# Patient Record
Sex: FEMALE | Race: WHITE | HISPANIC OR LATINO | Employment: OTHER | URBAN - METROPOLITAN AREA
[De-identification: names, ages, dates, MRNs, and addresses within clinical notes are randomized per-mention and may not be internally consistent; named-entity substitution may affect disease eponyms.]

---

## 2020-05-20 ENCOUNTER — TELEMEDICINE (OUTPATIENT)
Dept: DERMATOLOGY | Facility: CLINIC | Age: 75
End: 2020-05-20
Payer: COMMERCIAL

## 2020-05-20 DIAGNOSIS — Z13.89 SCREENING FOR SKIN CONDITION: ICD-10-CM

## 2020-05-20 DIAGNOSIS — L40.9 PSORIASIS: Primary | ICD-10-CM

## 2020-05-20 PROCEDURE — 99203 OFFICE O/P NEW LOW 30 MIN: CPT | Performed by: DERMATOLOGY

## 2020-05-20 RX ORDER — BETAMETHASONE VALERATE 0.1 %
LOTION (ML) TOPICAL 2 TIMES DAILY
Qty: 60 ML | Refills: 2 | Status: SHIPPED | OUTPATIENT
Start: 2020-05-20

## 2020-05-20 RX ORDER — ATORVASTATIN CALCIUM 20 MG/1
TABLET, FILM COATED ORAL
COMMUNITY
Start: 2020-03-16

## 2020-05-20 RX ORDER — AMLODIPINE BESYLATE 10 MG/1
TABLET ORAL
COMMUNITY
Start: 2020-04-27

## 2020-08-11 ENCOUNTER — APPOINTMENT (OUTPATIENT)
Dept: LAB | Facility: CLINIC | Age: 75
End: 2020-08-11
Payer: COMMERCIAL

## 2020-08-11 DIAGNOSIS — L40.9 PSORIASIS: ICD-10-CM

## 2020-08-11 LAB
ALBUMIN SERPL BCP-MCNC: 3.4 G/DL (ref 3.5–5)
ALP SERPL-CCNC: 92 U/L (ref 46–116)
ALT SERPL W P-5'-P-CCNC: 23 U/L (ref 12–78)
ANION GAP SERPL CALCULATED.3IONS-SCNC: 5 MMOL/L (ref 4–13)
AST SERPL W P-5'-P-CCNC: 15 U/L (ref 5–45)
BASOPHILS # BLD AUTO: 0.05 THOUSANDS/ΜL (ref 0–0.1)
BASOPHILS NFR BLD AUTO: 1 % (ref 0–1)
BILIRUB SERPL-MCNC: 0.97 MG/DL (ref 0.2–1)
BUN SERPL-MCNC: 15 MG/DL (ref 5–25)
CALCIUM SERPL-MCNC: 9.2 MG/DL (ref 8.3–10.1)
CHLORIDE SERPL-SCNC: 107 MMOL/L (ref 100–108)
CO2 SERPL-SCNC: 27 MMOL/L (ref 21–32)
CREAT SERPL-MCNC: 0.84 MG/DL (ref 0.6–1.3)
EOSINOPHIL # BLD AUTO: 0.22 THOUSAND/ΜL (ref 0–0.61)
EOSINOPHIL NFR BLD AUTO: 5 % (ref 0–6)
ERYTHROCYTE [DISTWIDTH] IN BLOOD BY AUTOMATED COUNT: 13.2 % (ref 11.6–15.1)
GFR SERPL CREATININE-BSD FRML MDRD: 68 ML/MIN/1.73SQ M
GLUCOSE SERPL-MCNC: 87 MG/DL (ref 65–140)
HCT VFR BLD AUTO: 45.5 % (ref 34.8–46.1)
HGB BLD-MCNC: 14.6 G/DL (ref 11.5–15.4)
IMM GRANULOCYTES # BLD AUTO: 0.01 THOUSAND/UL (ref 0–0.2)
IMM GRANULOCYTES NFR BLD AUTO: 0 % (ref 0–2)
LYMPHOCYTES # BLD AUTO: 2.12 THOUSANDS/ΜL (ref 0.6–4.47)
LYMPHOCYTES NFR BLD AUTO: 47 % (ref 14–44)
MCH RBC QN AUTO: 31.1 PG (ref 26.8–34.3)
MCHC RBC AUTO-ENTMCNC: 32.1 G/DL (ref 31.4–37.4)
MCV RBC AUTO: 97 FL (ref 82–98)
MONOCYTES # BLD AUTO: 0.43 THOUSAND/ΜL (ref 0.17–1.22)
MONOCYTES NFR BLD AUTO: 10 % (ref 4–12)
NEUTROPHILS # BLD AUTO: 1.67 THOUSANDS/ΜL (ref 1.85–7.62)
NEUTS SEG NFR BLD AUTO: 37 % (ref 43–75)
NRBC BLD AUTO-RTO: 0 /100 WBCS
PLATELET # BLD AUTO: 265 THOUSANDS/UL (ref 149–390)
PMV BLD AUTO: 10.2 FL (ref 8.9–12.7)
POTASSIUM SERPL-SCNC: 3.7 MMOL/L (ref 3.5–5.3)
PROT SERPL-MCNC: 7.6 G/DL (ref 6.4–8.2)
RBC # BLD AUTO: 4.69 MILLION/UL (ref 3.81–5.12)
SODIUM SERPL-SCNC: 139 MMOL/L (ref 136–145)
WBC # BLD AUTO: 4.5 THOUSAND/UL (ref 4.31–10.16)

## 2020-08-11 PROCEDURE — 36415 COLL VENOUS BLD VENIPUNCTURE: CPT

## 2020-08-11 PROCEDURE — 85025 COMPLETE CBC W/AUTO DIFF WBC: CPT

## 2020-08-11 PROCEDURE — 80053 COMPREHEN METABOLIC PANEL: CPT

## 2020-08-11 PROCEDURE — 86480 TB TEST CELL IMMUN MEASURE: CPT

## 2020-08-12 ENCOUNTER — TELEPHONE (OUTPATIENT)
Dept: DERMATOLOGY | Facility: CLINIC | Age: 75
End: 2020-08-12

## 2020-08-13 DIAGNOSIS — L40.9 PSORIASIS: Primary | ICD-10-CM

## 2020-08-13 LAB
GAMMA INTERFERON BACKGROUND BLD IA-ACNC: 0.09 IU/ML
M TB IFN-G BLD-IMP: NEGATIVE
M TB IFN-G CD4+ BCKGRND COR BLD-ACNC: -0.01 IU/ML
M TB IFN-G CD4+ BCKGRND COR BLD-ACNC: 0 IU/ML
MITOGEN IGNF BCKGRD COR BLD-ACNC: >10 IU/ML

## 2020-08-13 RX ORDER — TILDRAKIZUMAB-ASMN 100 MG/ML
100 INJECTION, SOLUTION SUBCUTANEOUS
Qty: 1 SYRINGE | Refills: 1 | Status: SHIPPED | OUTPATIENT
Start: 2020-08-13 | End: 2020-11-30 | Stop reason: SDUPTHER

## 2020-08-14 ENCOUNTER — TELEPHONE (OUTPATIENT)
Dept: DERMATOLOGY | Facility: CLINIC | Age: 75
End: 2020-08-14

## 2020-08-20 ENCOUNTER — OFFICE VISIT (OUTPATIENT)
Dept: DERMATOLOGY | Facility: CLINIC | Age: 75
End: 2020-08-20
Payer: COMMERCIAL

## 2020-08-20 ENCOUNTER — TELEPHONE (OUTPATIENT)
Dept: DERMATOLOGY | Facility: CLINIC | Age: 75
End: 2020-08-20

## 2020-08-20 VITALS — TEMPERATURE: 97.7 F

## 2020-08-20 DIAGNOSIS — L40.9 PSORIASIS: Primary | ICD-10-CM

## 2020-08-20 DIAGNOSIS — Z13.89 SCREENING FOR SKIN CONDITION: ICD-10-CM

## 2020-08-20 PROCEDURE — 99213 OFFICE O/P EST LOW 20 MIN: CPT | Performed by: DERMATOLOGY

## 2020-08-20 NOTE — PATIENT INSTRUCTIONS
Psoriasis pretty active despite therapy will go ahead at this point and have patient go on Illumya injection today and in 3 months if no improvement noted 6 months we will discuss other options

## 2020-08-20 NOTE — PROGRESS NOTES
[address] [facility] DERMATOLOGY  [address] [place] [address] [place] [facility] [id number] [phone]     MRN: [id number] : 1945  Encounter: [id number]  Patient Information: Dashawn Newton  Chief complaint: Three-month follow-up for psoriasis    History of present illness:  26-year-old female who was previously under the care of Dr Reyes Canard for psoriasis  Patient has been under treatment with Pat Harps few months Humira 3-4 years Stelara 2 years and Cosentyx 1 year previously and noted improvement initially with all these except for Pat Harps and then flared back up  Patient has been on Trinity Iza for six-month also noted initial improvement however now seems to be increased activity  Past Medical History:   Diagnosis Date    Hyperlipidemia     Hypertension     Kidney stones      No past surgical history on file  Social History   Social History     Substance and Sexual Activity   Alcohol Use Yes    Frequency: 2-4 times a month    Drinks per session: 1 or 2    Binge frequency: Never     Social History     Substance and Sexual Activity   Drug Use Never     Social History     Tobacco Use   Smoking Status Former Smoker    Packs/day: 0 25    Years: 40 00    Pack years: 10 00    Types: Cigarettes    Last attempt to quit: 2000    Years since quittin 2   Smokeless Tobacco Never Used     Family History   Problem Relation Age of Onset    Diabetes Mother      Meds/Allergies   No Known Allergies    Meds:  Prior to Admission medications    Medication Sig Start Date End Date Taking?  Authorizing Provider   amLODIPine (NORVASC) 10 mg tablet TAKE 1 TABLET BY MOUTH EVERY DAY 20  Yes Historical Provider, MD   atorvastatin (LIPITOR) 20 mg tablet TAKE 1 TABLET BY MOUTH EVERY DAY EVERY NIGHT 3/16/20  Yes Historical Provider, MD   Tildrakizumab-asmn (Ilumya) 100 MG/ML SOSY Inject 100 mg under the skin every 3 (three) months 20  Yes Pearl Dailey MD   betamethasone valerate (VALISONE) 0 1 % lotion Apply topically 2 (two) times a day To scalp  Patient not taking: Reported on 8/20/2020 5/20/20   Gloria Moseley MD       Subjective:     Review of Systems:    General: negative for - chills, fatigue, fever,  weight gain or weight loss  Psychological: negative for - anxiety, behavioral disorder, concentration difficulties, decreased libido, depression, irritability, memory difficulties, mood swings, sleep disturbances or suicidal ideation  ENT: negative for - hearing difficulties , nasal congestion, nasal discharge, oral lesions, sinus pain, sneezing, sore throat  Allergy and Immunology: negative for - hives, insect bite sensitivity,  Hematological and Lymphatic: negative for - bleeding problems, blood clots,bruising, swollen lymph nodes  Endocrine: negative for - hair pattern changes, hot flashes, malaise/lethargy, mood swings, palpitations, polydipsia/polyuria, skin changes, temperature intolerance or unexpected weight change  Respiratory: negative for - cough, hemoptysis, orthopnea, shortness of breath, or wheezing  Cardiovascular: negative for - chest pain, dyspnea on exertion, edema,  Gastrointestinal: negative for - abdominal pain, nausea/vomiting  Genito-Urinary: negative for - dysuria, incontinence, irregular/heavy menses or urinary frequency/urgency  Musculoskeletal: negative for - gait disturbance, joint pain, joint stiffness, joint swelling, muscle pain, muscular weakness  Dermatological:  As in HPI  Neurological: negative for confusion, dizziness, headaches, impaired coordination/balance, memory loss, numbness/tingling, seizures, speech problems, tremors or weakness       Objective:   Temp 97 7 °F (36 5 °C)     Physical Exam:    General Appearance:    Alert, cooperative, no distress   Head:    Normocephalic, without obvious abnormality, atraumatic           Skin:   A full skin exam was performed including scalp, head scalp, eyes, ears, nose, lips, neck, chest, axilla, abdomen, back, buttocks, bilateral upper extremities, bilateral lower extremities, hands, feet, fingers, toes, fingernails, and toenails scaling erythematous well-demarcated plaques noted on the dorsum of the legs plantar surface of the feet dorsum of the arms back and buttocks     Assessment:     1  Psoriasis     2  Screening for skin condition           Plan:   Psoriasis pretty active despite therapy will go ahead at this point and have patient go on Illumya injection today and in 3 months if no improvement noted 6 months we will discuss other options    Gloria oMseley MD  8/20/2020,4:24 PM    Portions of the record may have been created with voice recognition software   Occasional wrong word or "sound a like" substitutions may have occurred due to the inherent limitations of voice recognition software   Read the chart carefully and recognize, using context, where substitutions have occurred

## 2020-11-02 ENCOUNTER — TELEPHONE (OUTPATIENT)
Dept: DERMATOLOGY | Facility: CLINIC | Age: 75
End: 2020-11-02

## 2020-11-18 ENCOUNTER — OFFICE VISIT (OUTPATIENT)
Dept: DERMATOLOGY | Facility: CLINIC | Age: 75
End: 2020-11-18
Payer: COMMERCIAL

## 2020-11-18 VITALS — TEMPERATURE: 97.3 F

## 2020-11-18 DIAGNOSIS — L40.9 PSORIASIS: Primary | ICD-10-CM

## 2020-11-18 PROCEDURE — 99213 OFFICE O/P EST LOW 20 MIN: CPT | Performed by: DERMATOLOGY

## 2020-11-30 DIAGNOSIS — L40.9 PSORIASIS: ICD-10-CM

## 2020-11-30 RX ORDER — TILDRAKIZUMAB-ASMN 100 MG/ML
100 INJECTION, SOLUTION SUBCUTANEOUS
Qty: 1 SYRINGE | Refills: 1 | Status: SHIPPED | OUTPATIENT
Start: 2020-11-30 | End: 2021-03-04 | Stop reason: SDUPTHER

## 2021-02-12 DIAGNOSIS — Z23 ENCOUNTER FOR IMMUNIZATION: ICD-10-CM

## 2021-02-16 ENCOUNTER — TELEPHONE (OUTPATIENT)
Dept: DERMATOLOGY | Facility: CLINIC | Age: 76
End: 2021-02-16

## 2021-02-16 NOTE — TELEPHONE ENCOUNTER
[employer] called  Said that paperwork for assistance and refills couldn't be done  Patient's insurance was terminated  Patient was called and she said she got new insurance  She was emailing it to me and calling [employer] to give them the new information      [phone] - Fax  [phone] - Phone

## 2021-03-01 ENCOUNTER — TELEPHONE (OUTPATIENT)
Dept: DERMATOLOGY | Facility: CLINIC | Age: 76
End: 2021-03-01

## 2021-03-01 NOTE — TELEPHONE ENCOUNTER
----- Message from [facility] sent at 3/1/2021 10:10 AM EST -----  PT CXL   3/2/21 APPT  HER INJECTION IS SUPPOSE TO BE DELIVERED THIS WEEK ??  CALL TO  [facility]   WHEN WE HAVE HER INJECTION  SHE IS SCHD  TO GET HER 1ST  COVID SHOT ON FRI 3/5/21  SHE WANTS TO KNOW IF THESE  INJECTIONS  SHOULD BE   TAKEN SO CLOSE TO EACH OTHER ?

## 2021-03-03 DIAGNOSIS — L40.9 PSORIASIS: ICD-10-CM

## 2021-03-04 ENCOUNTER — TELEPHONE (OUTPATIENT)
Dept: DERMATOLOGY | Facility: CLINIC | Age: 76
End: 2021-03-04

## 2021-03-04 DIAGNOSIS — L40.9 PSORIASIS: ICD-10-CM

## 2021-03-04 RX ORDER — TILDRAKIZUMAB-ASMN 100 MG/ML
100 INJECTION, SOLUTION SUBCUTANEOUS
Qty: 1 SYRINGE | Refills: 3 | Status: SHIPPED | OUTPATIENT
Start: 2021-03-04 | End: 2021-09-09 | Stop reason: ALTCHOICE

## 2021-03-04 RX ORDER — TILDRAKIZUMAB-ASMN 100 MG/ML
100 INJECTION, SOLUTION SUBCUTANEOUS
Qty: 1 SYRINGE | Refills: 3 | OUTPATIENT
Start: 2021-03-04

## 2021-03-16 ENCOUNTER — OFFICE VISIT (OUTPATIENT)
Dept: DERMATOLOGY | Facility: CLINIC | Age: 76
End: 2021-03-16
Payer: COMMERCIAL

## 2021-03-16 VITALS — TEMPERATURE: 98.3 F

## 2021-03-16 DIAGNOSIS — L40.9 PSORIASIS: Primary | ICD-10-CM

## 2021-03-16 DIAGNOSIS — Z13.89 SCREENING FOR SKIN CONDITION: ICD-10-CM

## 2021-03-16 PROCEDURE — 99213 OFFICE O/P EST LOW 20 MIN: CPT | Performed by: DERMATOLOGY

## 2021-03-16 NOTE — PROGRESS NOTES
[address]  [address] [facility] [address]  [phone]  [phone]     MRN: [id number] : 1945  Encounter: [id number]  Patient Information: Celeste Blum  Chief complaint:  Follow-up for psoriasis    History of present illness:  54-year-old female with history of psoriasis continues to have flare despite being on the [facility] for over a year we had previously discussed that patient has been on numerous other therapies including both Cosentyx as well as [place] and Stelara and Humira  Patient wishes to to consider other therapy at this time  Past Medical History:   Diagnosis Date    Hyperlipidemia     Hypertension     Kidney stones      History reviewed  No pertinent surgical history  Social History   Social History     Substance and Sexual Activity   Alcohol Use Yes    Frequency: 2-4 times a month    Drinks per session: 1 or 2    Binge frequency: Never     Social History     Substance and Sexual Activity   Drug Use Never     Social History     Tobacco Use   Smoking Status Former Smoker    Packs/day: 0 25    Years: 40 00    Pack years: 10 00    Types: Cigarettes    Quit date: 2000    Years since quittin 8   Smokeless Tobacco Never Used     Family History   Problem Relation Age of Onset    Diabetes Mother      Meds/Allergies   No Known Allergies    Meds:  Prior to Admission medications    Medication Sig Start Date End Date Taking?  Authorizing Provider   amLODIPine (NORVASC) 10 mg tablet TAKE 1 TABLET BY MOUTH EVERY DAY 20  Yes Historical Provider, MD   atorvastatin (LIPITOR) 20 mg tablet TAKE 1 TABLET BY MOUTH EVERY DAY EVERY NIGHT 3/16/20  Yes Historical Provider, MD   Tildrakizumab-asmn (Ilumya) 100 MG/ML SOSY Inject 100 mg under the skin every 3 (three) months 3/4/21  Yes Florence Bryant MD   betamethasone valerate (VALISONE) 0 1 % lotion Apply topically 2 (two) times a day To scalp  Patient not taking: Reported on 8/20/2020 5/20/20   Anders Carter MD       Subjective:     Review of Systems:    General: negative for - chills, fatigue, fever,  weight gain or weight loss  Psychological: negative for - anxiety, behavioral disorder, concentration difficulties, decreased libido, depression, irritability, memory difficulties, mood swings, sleep disturbances or suicidal ideation  ENT: negative for - hearing difficulties , nasal congestion, nasal discharge, oral lesions, sinus pain, sneezing, sore throat  Allergy and Immunology: negative for - hives, insect bite sensitivity,  Hematological and Lymphatic: negative for - bleeding problems, blood clots,bruising, swollen lymph nodes  Endocrine: negative for - hair pattern changes, hot flashes, malaise/lethargy, mood swings, palpitations, polydipsia/polyuria, skin changes, temperature intolerance or unexpected weight change  Respiratory: negative for - cough, hemoptysis, orthopnea, shortness of breath, or wheezing  Cardiovascular: negative for - chest pain, dyspnea on exertion, edema,  Gastrointestinal: negative for - abdominal pain, nausea/vomiting  Genito-Urinary: negative for - dysuria, incontinence, irregular/heavy menses or urinary frequency/urgency  Musculoskeletal: negative for - gait disturbance, joint pain, joint stiffness, joint swelling, muscle pain, muscular weakness  Dermatological:  As in HPI  Neurological: negative for confusion, dizziness, headaches, impaired coordination/balance, memory loss, numbness/tingling, seizures, speech problems, tremors or weakness       Objective:   Temp 98 3 °F (36 8 °C)     Physical Exam:    General Appearance:    Alert, cooperative, no distress   Head:    Normocephalic, without obvious abnormality, atraumatic           Skin:   A full skin exam was performed including scalp, head scalp, eyes, ears, nose, lips, neck, chest, axilla, abdomen, back, buttocks, bilateral upper extremities, bilateral lower extremities, hands, feet, fingers, toes, fingernails, and toenails scaling erythematous plaques involving 30% of body surface area may be slight improvement from previous on her lower legs     Assessment:     1  Psoriasis     2  Screening for skin condition           Plan:   Psoriasis at present will go ahead and see if we get patient [place] patient will receive her injection of Terrie Delvalle Res, MD  9/40/5955,9:54 PM    Portions of the record may have been created with voice recognition software   Occasional wrong word or "sound a like" substitutions may have occurred due to the inherent limitations of voice recognition software   Read the chart carefully and recognize, using context, where substitutions have occurred

## 2021-03-16 NOTE — PROGRESS NOTES
Patient received her Ilumya Injection 100MG/mL    NDC: 78963-899-59  LOT: [id number]  EXP: 02/2022  SITE:  Left Upper Arm

## 2021-03-16 NOTE — PATIENT INSTRUCTIONS
Psoriasis at present will go ahead and see if we get patient Baron Motta patient will receive her injection of Iluymya today

## 2021-03-17 ENCOUNTER — TELEPHONE (OUTPATIENT)
Dept: DERMATOLOGY | Facility: CLINIC | Age: 76
End: 2021-03-17

## 2021-03-22 ENCOUNTER — TELEPHONE (OUTPATIENT)
Dept: DERMATOLOGY | Facility: CLINIC | Age: 76
End: 2021-03-22

## 2021-03-22 NOTE — TELEPHONE ENCOUNTER
Spoke with [employer] and started a prior authorization for [employer]  Spoke with [facility]  Two prior authorizations were started:    [facility]-[id number] - Quantity Review  [facility]-[id number] - Prior Authorization for Medication    Will have the outcome on 3/25/2021 at 3:00 pm    Also, spoke with Iraj Kenyon at [address] to clarify the prescription for [employer]

## 2021-04-12 ENCOUNTER — TELEPHONE (OUTPATIENT)
Dept: DERMATOLOGY | Facility: CLINIC | Age: 76
End: 2021-04-12

## 2021-05-28 ENCOUNTER — TELEPHONE (OUTPATIENT)
Dept: OTHER | Facility: OTHER | Age: 76
End: 2021-05-28

## 2021-05-28 NOTE — TELEPHONE ENCOUNTER
Beth Samano is trying to set up delivery of Ilumya for the patient  Please call her to tell her when the patient's next appointment is, so that she can set up delivery

## 2021-08-16 ENCOUNTER — TELEPHONE (OUTPATIENT)
Dept: DERMATOLOGY | Facility: CLINIC | Age: 76
End: 2021-08-16

## 2021-09-08 ENCOUNTER — TELEPHONE (OUTPATIENT)
Dept: DERMATOLOGY | Facility: CLINIC | Age: 76
End: 2021-09-08

## 2021-09-09 ENCOUNTER — TELEMEDICINE (OUTPATIENT)
Dept: DERMATOLOGY | Facility: CLINIC | Age: 76
End: 2021-09-09
Payer: COMMERCIAL

## 2021-09-09 DIAGNOSIS — L40.9 PSORIASIS: Primary | ICD-10-CM

## 2021-09-09 DIAGNOSIS — Z13.89 SCREENING FOR SKIN CONDITION: ICD-10-CM

## 2021-09-09 PROCEDURE — 99213 OFFICE O/P EST LOW 20 MIN: CPT | Performed by: DERMATOLOGY

## 2021-09-09 RX ORDER — RISANKIZUMAB-RZAA 75 MG/0.83
KIT SUBCUTANEOUS
COMMUNITY
Start: 2021-03-19

## 2021-09-09 NOTE — PROGRESS NOTES
Virtual Regular Visit    Verification of patient location:    Patient is located in the following state in which I hold an active license [place]      Assessment/Plan:  1  Psoriasis     2  Screening for skin condition      psoriasis under better control   still active will go ahead and continue same therapy will need to repeat her blood work at her next visit  Will go ahead and continue same therapy and re-evaluate in 3 months    Problem List Items Addressed This Visit     None               Reason for visit is  Follow-up for psoriasis  Chief Complaint   Patient presents with    Follow-up    Virtual Regular Visit    Psoriasis    Virtual Regular Visit        Encounter provider [address]    Provider located at [address] [place] [address]      Recent Visits  Date Type Provider Dept   09/08/21 Telephone [facility] Dermatology   Showing recent visits within past 7 days and meeting all other requirements  Today's Visits  Date Type Provider Dept   09/09/21 Telemedicine [address] Dermatology   Showing today's visits and meeting all other requirements  Future Appointments  No visits were found meeting these conditions  Showing future appointments within next 150 days and meeting all other requirements       The patient was identified by name and date of birth  Gena De Paz was informed that this is a telemedicine visit and that the visit is being conducted through [address] [place] [address] Now and patient was informed that this is a secure, HIPAA-compliant platform  She agrees to proceed     My office door was closed  No one else was in the room  She acknowledged consent and understanding of privacy and security of the video platform  The patient has agreed to participate and understands they can discontinue the visit at any time      Patient is aware this is a billable service  Subjective  Connie Rasmussen is a 67 y o  female  With history of resistant psoriasis presents for follow-up patient was switched to [facility] and has received 3 injections so far patient notes some improvement but still has consistent disease patient has noticed some improvement but feels also the son is helping her at this time  Patient notably had a blood tests by Dr Prince Quiroga which revealed an VILLA 1:320 with nucleolar pattern  HPI     Past Medical History:   Diagnosis Date    Hyperlipidemia     Hypertension     Kidney stones        No past surgical history on file  Current Outpatient Medications   Medication Sig Dispense Refill    Risankizumab-rzaa,150 MG Dose, (Skyrizi, 150 MG Dose,) 75 MG/0 83ML PSKT       amLODIPine (NORVASC) 10 mg tablet TAKE 1 TABLET BY MOUTH EVERY DAY      atorvastatin (LIPITOR) 20 mg tablet TAKE 1 TABLET BY MOUTH EVERY DAY EVERY NIGHT      betamethasone valerate (VALISONE) 0 1 % lotion Apply topically 2 (two) times a day To scalp (Patient not taking: Reported on 8/20/2020) 60 mL 2     No current facility-administered medications for this visit  No Known Allergies    Review of Systems    Video Exam    There were no vitals filed for this visit  Physical Exam   Scaling erythematous patches noted on the arms and legs appear much thinner but present    I spent 15 minutes directly with the patient during this visit    VIRTUAL VISIT DISCLAIMER      Connie Rasmussen verbally agrees to participate in [employer]  Pt is aware that [employer] could be limited without vital signs or the ability to perform a full hands-on physical Shannan Marrufo understands she or the provider may request at any time to terminate the video visit and request the patient to seek care or treatment in person

## 2021-10-21 DIAGNOSIS — L40.9 PSORIASIS: Primary | ICD-10-CM

## 2021-10-21 RX ORDER — RISANKIZUMAB-RZAA 150 MG/ML
150 INJECTION SUBCUTANEOUS
Qty: 150 ML | Refills: 1 | Status: SHIPPED | OUTPATIENT
Start: 2021-10-21 | End: 2022-05-12 | Stop reason: SDUPTHER

## 2021-11-01 ENCOUNTER — TELEPHONE (OUTPATIENT)
Dept: DERMATOLOGY | Facility: CLINIC | Age: 76
End: 2021-11-01

## 2021-12-02 ENCOUNTER — TELEPHONE (OUTPATIENT)
Dept: DERMATOLOGY | Facility: CLINIC | Age: 76
End: 2021-12-02

## 2021-12-09 ENCOUNTER — OFFICE VISIT (OUTPATIENT)
Dept: DERMATOLOGY | Facility: CLINIC | Age: 76
End: 2021-12-09
Payer: COMMERCIAL

## 2021-12-09 VITALS — TEMPERATURE: 98.9 F | WEIGHT: 178.2 LBS

## 2021-12-09 DIAGNOSIS — Z13.89 SCREENING FOR SKIN CONDITION: ICD-10-CM

## 2021-12-09 DIAGNOSIS — L40.9 PSORIASIS: Primary | ICD-10-CM

## 2021-12-09 PROCEDURE — 99213 OFFICE O/P EST LOW 20 MIN: CPT | Performed by: DERMATOLOGY

## 2022-03-07 ENCOUNTER — TELEPHONE (OUTPATIENT)
Dept: DERMATOLOGY | Facility: CLINIC | Age: 77
End: 2022-03-07

## 2022-03-07 NOTE — TELEPHONE ENCOUNTER
Prior Authorization started on the phone with [employer] for [employer]  [id number]  26 hour turn around

## 2022-04-25 ENCOUNTER — OFFICE VISIT (OUTPATIENT)
Dept: DERMATOLOGY | Facility: CLINIC | Age: 77
End: 2022-04-25
Payer: COMMERCIAL

## 2022-04-25 VITALS — HEIGHT: 61 IN | BODY MASS INDEX: 31.15 KG/M2 | WEIGHT: 165 LBS

## 2022-04-25 DIAGNOSIS — L40.9 PSORIASIS: Primary | ICD-10-CM

## 2022-04-25 DIAGNOSIS — Z13.89 SCREENING FOR SKIN CONDITION: ICD-10-CM

## 2022-04-25 PROCEDURE — 99214 OFFICE O/P EST MOD 30 MIN: CPT | Performed by: DERMATOLOGY

## 2022-04-25 RX ORDER — BETAMETHASONE DIPROPIONATE 0.5 MG/G
CREAM TOPICAL 2 TIMES DAILY
Qty: 45 G | Refills: 3 | Status: SHIPPED | OUTPATIENT
Start: 2022-04-25

## 2022-04-25 NOTE — PROGRESS NOTES
[address] [facility] DERMATOLOGY  [address] [place] [address]  [phone]  [phone]     MRN: [id number] : 1945  Encounter: [id number]  Patient Information: Elvis Lindquist  Chief complaint:  Four-month Checkup    History of present illness:  76-year-old female with psoriasis on [place] notes a slight flare up on her legs about a month prior to her expected injection other that has been doing well  No other concerns noted  Past Medical History:   Diagnosis Date    Hyperlipidemia     Hypertension     Kidney stones      History reviewed  No pertinent surgical history  Social History   Social History     Substance and Sexual Activity   Alcohol Use Yes     Social History     Substance and Sexual Activity   Drug Use Never     Social History     Tobacco Use   Smoking Status Former Smoker    Packs/day: 0 25    Years: 40 00    Pack years: 10 00    Types: Cigarettes    Quit date: 2000    Years since quittin 9   Smokeless Tobacco Never Used     Family History   Problem Relation Age of Onset    Diabetes Mother      Meds/Allergies   No Known Allergies    Meds:  Prior to Admission medications    Medication Sig Start Date End Date Taking?  Authorizing Provider   amLODIPine (NORVASC) 10 mg tablet TAKE 1 TABLET BY MOUTH EVERY DAY 20  Yes Historical Provider, MD   atorvastatin (LIPITOR) 20 mg tablet TAKE 1 TABLET BY MOUTH EVERY DAY EVERY NIGHT 3/16/20  Yes Historical Provider, MD Liseth Hoffman Dex Share) 150 MG/ML SOSY Inject 150 mg under the skin every 3 (three) months 10/21/21  Yes Shyam Dillon MD   betamethasone valerate (VALISONE) 0 1 % lotion Apply topically 2 (two) times a day To scalp  Patient not taking: Reported on 2020   Shyam Dillon MD   Risankizumab-rzaa,150 MG Dose, (Skyrizi, 150 MG Dose,) 75 MG/0 83ML PSKT  3/19/21   Historical Provider, MD       Subjective:     Review of Systems:    General: negative for - chills, fatigue, fever,  weight gain or weight loss  Psychological: negative for - anxiety, behavioral disorder, concentration difficulties, decreased libido, depression, irritability, memory difficulties, mood swings, sleep disturbances or suicidal ideation  ENT: negative for - hearing difficulties , nasal congestion, nasal discharge, oral lesions, sinus pain, sneezing, sore throat  Allergy and Immunology: negative for - hives, insect bite sensitivity,  Hematological and Lymphatic: negative for - bleeding problems, blood clots,bruising, swollen lymph nodes  Endocrine: negative for - hair pattern changes, hot flashes, malaise/lethargy, mood swings, palpitations, polydipsia/polyuria, skin changes, temperature intolerance or unexpected weight change  Respiratory: negative for - cough, hemoptysis, orthopnea, shortness of breath, or wheezing  Cardiovascular: negative for - chest pain, dyspnea on exertion, edema,  Gastrointestinal: negative for - abdominal pain, nausea/vomiting  Genito-Urinary: negative for - dysuria, incontinence, irregular/heavy menses or urinary frequency/urgency  Musculoskeletal: negative for - gait disturbance, joint pain, joint stiffness, joint swelling, muscle pain, muscular weakness  Dermatological:  As in HPI  Neurological: negative for confusion, dizziness, headaches, impaired coordination/balance, memory loss, numbness/tingling, seizures, speech problems, tremors or weakness       Objective:   Ht 5' 1" (1 549 m)   Wt 74 8 kg (165 lb)   BMI 31 18 kg/m²     Physical Exam:    General Appearance:    Alert, cooperative, no distress   Head:    Normocephalic, without obvious abnormality, atraumatic           Skin:   A full skin exam was performed including scalp, head scalp, eyes, ears, nose, lips, neck, chest, axilla, abdomen, back, buttocks, bilateral upper extremities, bilateral lower extremities, hands, feet, fingers, toes, fingernails, and toenails erythema scaling well-demarcated patches noted on the arms and legs  Assessment:     1  Psoriasis     2  Screening for skin condition           Plan:   Psoriasis flaring slightly on Skyrizi toward the end of her  3 month cycle  Will go ahead and add a topical corticosteroid to her regimen and see if this will help if no further improvement will need to discuss other options  Since patient has tried many different other medications including TNF inhibitors Stelara, IL 17 and IL 23 she has exhausted most of the treatments we have noted and may have difficulty finding something that may work better for her  Nothing else of concern noted exam follow-up in 4 months        Florence Bryant MD  4/25/2022,3:28 PM    Portions of the record may have been created with voice recognition software   Occasional wrong word or "sound a like" substitutions may have occurred due to the inherent limitations of voice recognition software   Read the chart carefully and recognize, using context, where substitutions have occurred

## 2022-04-25 NOTE — PATIENT INSTRUCTIONS
Psoriasis flaring slightly on Skyrizi toward the end of her  3 month cycle  Will go ahead and add a topical corticosteroid to her regimen and see if this will help if no further improvement will need to discuss other options  Since patient has tried many different other medications including TNF inhibitors Stelara, IL 17 and IL 23 she has exhausted most of the treatments we have noted and may have difficulty finding something that may work better for her    Nothing else of concern noted exam follow-up in 4 months

## 2022-05-12 DIAGNOSIS — L40.9 PSORIASIS: ICD-10-CM

## 2022-05-12 RX ORDER — RISANKIZUMAB-RZAA 150 MG/ML
150 INJECTION SUBCUTANEOUS
Qty: 150 ML | Refills: 1 | Status: SHIPPED | OUTPATIENT
Start: 2022-05-12

## 2022-07-01 NOTE — PATIENT INSTRUCTIONS
psoriasis under better control   still active will go ahead and continue same therapy will need to repeat her blood work at her next visit    Will go ahead and continue same therapy and re-evaluate in 3 months
n/a

## 2022-08-23 ENCOUNTER — OFFICE VISIT (OUTPATIENT)
Dept: DERMATOLOGY | Facility: CLINIC | Age: 77
End: 2022-08-23
Payer: COMMERCIAL

## 2022-08-23 VITALS — HEIGHT: 62 IN | BODY MASS INDEX: 31.28 KG/M2 | WEIGHT: 170 LBS

## 2022-08-23 DIAGNOSIS — Z13.89 SCREENING FOR SKIN CONDITION: ICD-10-CM

## 2022-08-23 DIAGNOSIS — L40.9 PSORIASIS: Primary | ICD-10-CM

## 2022-08-23 PROCEDURE — 99213 OFFICE O/P EST LOW 20 MIN: CPT | Performed by: DERMATOLOGY

## 2022-08-23 NOTE — PATIENT INSTRUCTIONS
Psoriasis hopefully will continue to improve will repeat a blood work at this time and plan follow-up again in 6 months  Nothing else of concern noted see her back in 6 months

## 2022-08-23 NOTE — PROGRESS NOTES
[facility] DERMATOLOGY  [address] [place] [address]  [place]ma [address]  [phone]  [phone]     MRN: [id number] : 1945  Encounter: [id number]  Patient Information: Kim Block  Chief complaint:  Follow-up for psoriasis on Skyrizi    History of present illness:  68-year-old female presents for follow-up secondary to her psoriasis she has been on [place] now has received 3 injections notes improvement with areas of thinning out have not completely resolved patient also using the topical steroid  Past Medical History:   Diagnosis Date    Hyperlipidemia     Hypertension     Kidney stones      History reviewed  No pertinent surgical history  Social History   Social History     Substance and Sexual Activity   Alcohol Use Yes     Social History     Substance and Sexual Activity   Drug Use Never     Social History     Tobacco Use   Smoking Status Former Smoker    Packs/day: 0 25    Years: 40 00    Pack years: 10 00    Types: Cigarettes    Quit date: 2000    Years since quittin 2   Smokeless Tobacco Never Used     Family History   Problem Relation Age of Onset    Diabetes Mother      Meds/Allergies   No Known Allergies    Meds:  Prior to Admission medications    Medication Sig Start Date End Date Taking?  Authorizing Provider   amLODIPine (NORVASC) 10 mg tablet TAKE 1 TABLET BY MOUTH EVERY DAY 20  Yes Historical Provider, MD   atorvastatin (LIPITOR) 20 mg tablet TAKE 1 TABLET BY MOUTH EVERY DAY EVERY NIGHT 3/16/20  Yes Historical Provider, MD   betamethasone dipropionate (DIPROSONE) 0 05 % cream Apply topically 2 (two) times a day 22  Yes MD Caroline Barriga Doll Osler) 150 MG/ML SOSY Inject 150 mg under the skin every 3 (three) months 22  Yes Efraín Bunch MD   betamethasone valerate (VALISONE) 0 1 % lotion Apply topically 2 (two) times a day To scalp  Patient not taking: Reported on 2020  Sarai Giron MD Jem   Risankizumab-rzaa,150 MG Dose, (Skyrizi, 150 MG Dose,) 75 MG/0 83ML PSKT  3/19/21 8/23/22  Historical Provider, MD       Subjective:     Review of Systems:    General: negative for - chills, fatigue, fever,  weight gain or weight loss  Psychological: negative for - anxiety, behavioral disorder, concentration difficulties, decreased libido, depression, irritability, memory difficulties, mood swings, sleep disturbances or suicidal ideation  ENT: negative for - hearing difficulties , nasal congestion, nasal discharge, oral lesions, sinus pain, sneezing, sore throat  Allergy and Immunology: negative for - hives, insect bite sensitivity,  Hematological and Lymphatic: negative for - bleeding problems, blood clots,bruising, swollen lymph nodes  Endocrine: negative for - hair pattern changes, hot flashes, malaise/lethargy, mood swings, palpitations, polydipsia/polyuria, skin changes, temperature intolerance or unexpected weight change  Respiratory: negative for - cough, hemoptysis, orthopnea, shortness of breath, or wheezing  Cardiovascular: negative for - chest pain, dyspnea on exertion, edema,  Gastrointestinal: negative for - abdominal pain, nausea/vomiting  Genito-Urinary: negative for - dysuria, incontinence, irregular/heavy menses or urinary frequency/urgency  Musculoskeletal: negative for - gait disturbance, joint pain, joint stiffness, joint swelling, muscle pain, muscular weakness  Dermatological:  As in HPI  Neurological: negative for confusion, dizziness, headaches, impaired coordination/balance, memory loss, numbness/tingling, seizures, speech problems, tremors or weakness       Objective:   Ht 5' 2" (1 575 m)   Wt 77 1 kg (170 lb)   BMI 31 09 kg/m²     Physical Exam:    General Appearance:    Alert, cooperative, no distress   Head:    Normocephalic, without obvious abnormality, atraumatic           Skin:   A full skin exam was performed including scalp, head scalp, eyes, ears, nose, lips, neck, chest, axilla, abdomen, back, buttocks, bilateral upper extremities, bilateral lower extremities, hands, feet, fingers, toes, fingernails, and toenails erythematous scaling patches noted on the shins little bit on the arms definitely has improved some clearing noted but still active     Assessment:     1  Psoriasis     2  Screening for skin condition           Plan:   Psoriasis hopefully will continue to improve will repeat a blood work at this time and plan follow-up again in 6 months  Nothing else of concern noted see her back in 6 months    Luisana Ventura MD  8/23/2022,2:57 PM    Portions of the record may have been created with voice recognition software   Occasional wrong word or "sound a like" substitutions may have occurred due to the inherent limitations of voice recognition software   Read the chart carefully and recognize, using context, where substitutions have occurred

## 2022-08-25 ENCOUNTER — TELEPHONE (OUTPATIENT)
Dept: DERMATOLOGY | Facility: CLINIC | Age: 77
End: 2022-08-25

## 2023-08-02 DIAGNOSIS — L40.9 PSORIASIS: ICD-10-CM

## 2023-08-02 RX ORDER — RISANKIZUMAB-RZAA 150 MG/ML
150 INJECTION SUBCUTANEOUS
Qty: 150 ML | Refills: 1 | Status: SHIPPED | OUTPATIENT
Start: 2023-08-02

## 2023-08-02 NOTE — TELEPHONE ENCOUNTER
Please see document scanned to media, incoming fax recieved "Refill Form for [facility]"     Please advise.

## 2023-08-07 DIAGNOSIS — L40.9 PSORIASIS: ICD-10-CM

## 2023-08-08 RX ORDER — BETAMETHASONE DIPROPIONATE 0.5 MG/G
CREAM TOPICAL 2 TIMES DAILY
Qty: 45 G | Refills: 0 | Status: SHIPPED | OUTPATIENT
Start: 2023-08-08

## 2023-08-09 NOTE — TELEPHONE ENCOUNTER
Patient left a message on [facility] voicemail for return call regarding her Skyrizi injection. Can you please reach out to her? Thanks!

## 2023-08-10 NOTE — TELEPHONE ENCOUNTER
Patient left voicemail message today at 11:56 am also regarding her [employer] to be sent to specialty pharmacy. Please call.

## 2023-08-10 NOTE — TELEPHONE ENCOUNTER
Called pharmacy and they only needed to speak with patient to set up delivery. Patient was notified to call pharmacy everything is set up and should be ready for her to get it. I advised patient to please call the office back once [place] to do a yearly check up for continuation or refills since she will need blood work too.

## 2023-08-10 NOTE — TELEPHONE ENCOUNTER
Patient left message requesting a call back regarding her Skyrizi injection. Please return patient's call. Thank you!

## 2023-08-10 NOTE — TELEPHONE ENCOUNTER
Rec'd call from patient (she states that she and the pharmacy have called 3 times this week?). Pharmacy will not release her medication to her until they hear from the office. Please return call to pharmacy and to patient.

## 2023-08-11 ENCOUNTER — TELEPHONE (OUTPATIENT)
Dept: DERMATOLOGY | Facility: CLINIC | Age: 78
End: 2023-08-11

## 2023-08-11 DIAGNOSIS — L40.9 PSORIASIS: ICD-10-CM

## 2023-08-11 NOTE — TELEPHONE ENCOUNTER
Patient called in with the number to her pharmacy [facility] assist   [phone] to send her medication correctly to them on Monday

## 2023-08-11 NOTE — TELEPHONE ENCOUNTER
Pt is calling to state that her Skyrizi prescription should have been sent to [facility] for refill (it was sent to [facility], which is incorrect). Please resend prescription to [facility] for pts skyrizi.  (form scanned into media on 8/2 which needs to be signed by provider and faxed). Pt to  to schedule apt, is out of town at this time. Thank you!

## 2023-08-14 RX ORDER — RISANKIZUMAB-RZAA 150 MG/ML
150 INJECTION SUBCUTANEOUS
Qty: 150 ML | Refills: 3 | Status: CANCELLED | OUTPATIENT
Start: 2023-08-14

## 2023-08-14 NOTE — TELEPHONE ENCOUNTER
Pt returned you call to make an appt since she needs her Skyrizi refill. No appts showing available. She is very uncomfortable and is asking for an appt today or tomorrow and wanted to speak with someone from you office. Is there anyway you can get her in? Please call her back. Sent message to [phone] to call Pt.

## 2023-08-14 NOTE — TELEPHONE ENCOUNTER
Called patient twice about availability and scheduling her for an appointment. Left two messages. We did have a 4:30 open up today if she would like it. If not the doctor said we can put her on the nurses schedule next week. The doctor is out on Wednesday and Thursday we have too many patients.

## 2023-08-14 NOTE — TELEPHONE ENCOUNTER
Rec'd another phone call from patient checking on availability for Dr. Katerin Moss and/or any other provider in derm. Explained lack of availability. Explained that message was sent to Dr. Johana Marques team to address. Patient verbalized understanding.

## 2023-08-14 NOTE — TELEPHONE ENCOUNTER
Called patient and left a message that she needs to be seen in order to get a refill. Told her to call the office and schedule an appointment.

## 2023-08-16 NOTE — TELEPHONE ENCOUNTER
Pt called in and I did schedule her on the nurses schedule per note in chart. If someone would like to take a look to make sure it's correct.  Thank you

## 2023-08-22 ENCOUNTER — OFFICE VISIT (OUTPATIENT)
Age: 78
End: 2023-08-22
Payer: COMMERCIAL

## 2023-08-22 ENCOUNTER — APPOINTMENT (OUTPATIENT)
Age: 78
End: 2023-08-22
Payer: COMMERCIAL

## 2023-08-22 VITALS — BODY MASS INDEX: 31.28 KG/M2 | WEIGHT: 170 LBS | HEIGHT: 62 IN

## 2023-08-22 DIAGNOSIS — L40.9 PSORIASIS: ICD-10-CM

## 2023-08-22 DIAGNOSIS — L40.9 PSORIASIS: Primary | ICD-10-CM

## 2023-08-22 PROCEDURE — 36415 COLL VENOUS BLD VENIPUNCTURE: CPT

## 2023-08-22 PROCEDURE — 99214 OFFICE O/P EST MOD 30 MIN: CPT | Performed by: DERMATOLOGY

## 2023-08-22 PROCEDURE — 86480 TB TEST CELL IMMUN MEASURE: CPT

## 2023-08-22 NOTE — PATIENT INSTRUCTIONS
PSORIASIS      Assessment and Plan:  Based on a thorough discussion of this condition and the management approach to it (including a comprehensive discussion of the known risks, side effects and potential benefits of treatment), the patient (family) agrees to implement the following specific plan:  Lab Work - TB Test  Continue Skyrizi 150MG. - Prescription needs to be sent to [facility]. Follow Up in 1 Year     Psoriasis is a chronic inflammatory condition that causes the body to make new skin cells in days rather than weeks. As these cells pile up on the surface of the skin, you may see thick, scaly patches of thickened skin. Psoriasis affects 2-4% of males and females. It can start at any age including childhood, with peaks of onset at 15-25 years and 50-60 years. It tends to persist lifelong, fluctuating in extent and severity. It is particularly common in Caucasians but may affect people of any race. About one-third of patients with psoriasis have family members with psoriasis. Psoriasis is multifactorial. It is classified as an immune-mediated inflammatory disease (IMID). Genetic factors are important and influence the type of psoriasis and response to treatment. What are the signs and symptoms of psoriasis? There are many different types of psoriasis that each have present uniquely. The types of psoriasis include:    Plaque psoriasis: About 80% to 90% of people who have psoriasis develop this type. When plaque psoriasis appears, you may see:  Plaque psoriasis usually presents with symmetrically distributed, red, scaly plaques with well-defined edges. The scale is typically silvery white, except in skin folds where the plaques often appear shiny and they may have a moist peeling surface. The most common sites are scalp, elbows and knees, but any part of the skin can be involved. The plaques are usually very persistent without treatment.   Itch is mostly mild but may be severe in some patients, leading to scratching and lichenification (thickened leathery skin with increased skin markings). Painful skin cracks or fissures may occur. When psoriatic plaques clear up, they may leave brown or pale marks that can be expected to fade over several months. Guttate psoriasis: When someone gets this type of psoriasis, you often see tiny bumps appear on the skin quite suddenly. The bumps tend to cover much of the torso, legs, and arms. Sometimes, the bumps also develop on the face, scalp, and ears. No matter where they appear, the bumps tend to be:   Small and scaly  [place]-colored to pink  Temporary, clearing in a few weeks or months without treatment  When guttate psoriasis clears, it may never return. Why this happens is still a bit of a mystery. Guttate psoriasis tends to develop in children and young adults who've had an infection, such as strep throat. It's possible that when the infection clears so does guttate psoriasis. It's also possible to have:  Guttate psoriasis for life  See the guttate psoriasis clear and plaque psoriasis develop later in life  Plaque psoriasis when you develop guttate psoriasis  There's no way to predict what will happen after the first flare-up of guttate psoriasis clears. Inverse psoriasis: This type of psoriasis develops in areas where skin touches skin, such as the armpits, genitals, and crease of the buttocks. Where the inverse psoriasis appears, you're likely to notice:  Smooth, red patches of skin that look raw  Little, if any, silvery-white coating  Sore or painful skin  Other names for this type of psoriasis are intertriginous psoriasis or flexural psoriasis. Pustular psoriasis: This type of psoriasis causes pus-filled bumps that usually appear only on the feet and hands. While the pus-filled bumps may look like an infection, the skin is not infected. The bumps don't contain bacteria or anything else that could cause an infection.   Where pustular psoriasis appears, you tend to notice:  Red, swollen skin that is dotted with pus-filled bumps  Extremely sore or painful skin  Brown dots (and sometimes scale) appear as the pus-filled bumps dry  Pustular psoriasis can make just about any activity that requires your hands or feet, such as typing or walking, unbearably painful. Pustular psoriasis (generalized): Serious and life-threatening, this rare type of psoriasis causes pus-filled bumps to develop on much of the skin. Also called von Zumbusch psoriasis, a flare-up causes this sequence of events:  Skin on most of the body suddenly turns dry, red, and tender. Within hours, pus-filled bumps cover most of the skin. Often within a day, the pus-filled bumps break open and pools of pus leak onto the skin. As the pus dries (usually within 24 to 48 hours), the skin dries out and peels (as shown in this picture). When the dried skin peels off, you see a smooth, glazed surface. In a few days or weeks, you may see a new crop of pus-filled bumps covering most of the skin, as the cycle repeats itself. Anyone with pustular psoriasis also feels very sick, and may develop a fever, headache, muscle weakness, and other symptoms. Medical care is often necessary to save the person's life. Erythrodermic psoriasis: Serious and life-threatening, this type of psoriasis requires immediate medical care. When someone develops erythrodermic psoriasis, you may notice:  Skin on most of the body looks burnt  Chills, fever, and the person looks extremely ill  Muscle weakness, a rapid pulse, and severe itch  The person may also be unable to keep warm, so hypothermia can set in quickly. Most people who develop this type of psoriasis already have another type of psoriasis. Before developing erythrodermic psoriasis, they often notice that their psoriasis is worsening or not improving with treatment.  If you notice either of these happening, see a board-certified dermatologist.    Nails    Nail psoriasis: With any type of psoriasis, you may see changes to your fingernails or toenails. About half of the people who have plaque psoriasis see signs of psoriasis on their fingernails at some point2. When psoriasis affects the nails, you may notice:  Tiny dents in your nails (called “nail pits”)  White, yellow, or brown discoloration under one or more nails  Crumbling, rough nails  A nail lifting up so that it's no longer attached  Buildup of skin cells beneath one or more nails, which lifts up the nail  Treatment and proper nail care can help you control nail psoriasis. Psoriatic arthritis: If you have psoriasis, it's important to pay attention to your joints. Some people who have psoriasis develop a type of arthritis called psoriatic arthritis. This is more likely to occur if you have severe psoriasis. Most people notice psoriasis on their skin years before they develop psoriatic arthritis. It's also possible to get psoriatic arthritis before psoriasis, but this is less common. When psoriatic arthritis develops, the signs can be subtle. At first, you may notice:  A swollen and tender joint, especially in a finger or toe  Heel pain  Swelling on the back of your leg, just above your heel  Stiffness in the morning that fades during the day  Like psoriasis, psoriatic arthritis cannot be cured. Treatment can prevent psoriatic arthritis from worsening, which is important. Allowed to progress, psoriatic arthritis can become disabling. Diagnosis and treatment of psoriasis   Psoriasis is usually diagnosed by clinical features, and skin biopsy if necessary. It is important to decrease factors that aggravate psoriasis. These include treating streptococcal infections, minimizing skin injuries, avoiding sun exposure if it exacerbates psoriasis, smoking, alcohol usage, decreasing stress, and maintaining an optimal body weight.  Certain medications such as lithium, beta blockers, antimalarials, and NSAIDs have also been implicated. Suddenly stopping oral steroids or strong topical steroids can cause rebound disease. There are many categories of psoriasis treatments available. Topical therapy  Mild psoriasis is generally treated with topical agents alone. Which treatment is selected may depend on body site, extent and severity of psoriasis. Emollients  Coal tar preparations  Dithranol  Salicylic acid  Vitamin D analogue (calcipotriol)  Topical corticosteroids  Calcineurin inhibitor (tacrolimus, pimecrolimus)  Phototherapy  Most psoriasis centres offer phototherapy with ultraviolet (UV) radiation, often in combination with topical or systemic agents. Types of phototherapy include  Narrowband UVB  Broadband UVB  Photochemotherapy (PUVA)  Targeted phototherapy  Systemic therapy  Moderate to severe psoriasis warrants treatment with a systemic agent and/or phototherapy. The most common treatments are:  Methotrexate  Ciclosporin  Acitretin  Other medicines occasionally used for psoriasis include:  Mycophenolate  Apremilast  Hydroxyurea  Azathioprine  6-mercaptopurine  Systemic corticosteroids are best avoided due to a risk of severe withdrawal flare of psoriasis and adverse effects. Biologics or targeted therapies are reserved for conventional treatment-resistant severe psoriasis, mainly because of expense, as side effects compare favorably with other systemic agents. These include:  Anti-tumour necrosis factor-alpha antagonists (anti-TNF?) infliximab, adalimumab and etanercept  The interleukin (IL)-12/23 antagonist ustekinumab  IL-17 antagonists such as secukinumab  Many other monoclonal antibodies are under investigation in the treatment of psoriasis.

## 2023-08-22 NOTE — PROGRESS NOTES
[facility] Note     Patient Name: Delfino Santillan  Encounter Date: August 22, 2023    Have you been cared for by a [facility] Dermatologist in the last 3 years and, if so, which description applies to you? Yes. I have been here within the last 3 years, and my medical history has NOT changed since that time. I am FEMALE/of child-bearing potential.    REVIEW OF SYSTEMS:  Have you recently had or currently have any of the following? · No changes in my recent health. PAST MEDICAL HISTORY:  Have you personally ever had or currently have any of the following? If "YES," then please provide more detail. · No changes in my medical history. FAMILY HISTORY:  Any "first degree relatives" (parent, brother, sister, or child) with the following? • No changes in my family's known health. PATIENT EXPERIENCE:    • Do you want the Dermatologist to perform a COMPLETE skin exam today including a clinical examination under the "bra and underwear" areas? Yes  • If necessary, do we have your permission to call and leave a detailed message on your Preferred Phone number that includes your specific medical information? Yes      No Known Allergies   Current Outpatient Medications:   •  amLODIPine (NORVASC) 10 mg tablet, TAKE 1 TABLET BY MOUTH EVERY DAY, Disp: , Rfl:   •  atorvastatin (LIPITOR) 20 mg tablet, TAKE 1 TABLET BY MOUTH EVERY DAY EVERY NIGHT, Disp: , Rfl:   •  betamethasone dipropionate (DIPROSONE) 0.05 % cream, Apply topically 2 (two) times a day, Disp: 45 g, Rfl: 0  •  Risankizumab-rzaa (Skyrizi) 150 MG/ML SOSY, Inject 150 mg under the skin every 3 (three) months, Disp: 150 mL, Rfl: 1          • Whom besides the patient is providing clinical information about today's encounter?   o NO ADDITIONAL HISTORIAN (patient alone provided history)    Physical Exam and Assessment/Plan by Diagnosis:    CHIEF COMPLAINT    65year old male or female patient presents today for Psoriasis Follow Up.   Patient has a No history of skin cancer. Patient is present today to get a refill of Skyrizi,  Patient has not been seen in a year. PSORIASIS    Physical Exam:  • Anatomic Location Affected:  Legs and lower arms  • Morphological Description:  Scaling, erythematous patches  • Severity: moderate  • Body Percent Affected: 10%  • Pertinent Positives:  • Pertinent Negatives: Additional History of Present Condition:     Assessment and Plan:  Based on a thorough discussion of this condition and the management approach to it (including a comprehensive discussion of the known risks, side effects and potential benefits of treatment), the patient (family) agrees to implement the following specific plan:  • Lab Work - TB Test  • Continue Skyrizi 150MG. - Prescription needs to be sent to [address] [place] [address]. • Follow Up in 1 Year     Psoriasis is a chronic inflammatory condition that causes the body to make new skin cells in days rather than weeks. As these cells pile up on the surface of the skin, you may see thick, scaly patches of thickened skin. Psoriasis affects 2-4% of males and females. It can start at any age including childhood, with peaks of onset at 15-25 years and 50-60 years. It tends to persist lifelong, fluctuating in extent and severity. It is particularly common in Caucasians but may affect people of any race. About one-third of patients with psoriasis have family members with psoriasis. Psoriasis is multifactorial. It is classified as an immune-mediated inflammatory disease (IMID). Genetic factors are important and influence the type of psoriasis and response to treatment. What are the signs and symptoms of psoriasis? There are many different types of psoriasis that each have present uniquely. The types of psoriasis include:    Plaque psoriasis: About 80% to 90% of people who have psoriasis develop this type.  When plaque psoriasis appears, you may see:  Plaque psoriasis usually presents with symmetrically distributed, red, scaly plaques with well-defined edges. The scale is typically silvery white, except in skin folds where the plaques often appear shiny and they may have a moist peeling surface. The most common sites are scalp, elbows and knees, but any part of the skin can be involved. The plaques are usually very persistent without treatment. Itch is mostly mild but may be severe in some patients, leading to scratching and lichenification (thickened leathery skin with increased skin markings). Painful skin cracks or fissures may occur. When psoriatic plaques clear up, they may leave brown or pale marks that can be expected to fade over several months. Guttate psoriasis: When someone gets this type of psoriasis, you often see tiny bumps appear on the skin quite suddenly. The bumps tend to cover much of the torso, legs, and arms. Sometimes, the bumps also develop on the face, scalp, and ears. No matter where they appear, the bumps tend to be:   • Small and scaly  • Ollie-colored to pink  • Temporary, clearing in a few weeks or months without treatment  When guttate psoriasis clears, it may never return. Why this happens is still a bit of a mystery. Guttate psoriasis tends to develop in children and young adults who've had an infection, such as strep throat. It's possible that when the infection clears so does guttate psoriasis. It's also possible to have:  • Guttate psoriasis for life  • See the guttate psoriasis clear and plaque psoriasis develop later in life  • Plaque psoriasis when you develop guttate psoriasis  There's no way to predict what will happen after the first flare-up of guttate psoriasis clears. Inverse psoriasis: This type of psoriasis develops in areas where skin touches skin, such as the armpits, genitals, and crease of the buttocks.  Where the inverse psoriasis appears, you're likely to notice:  • Smooth, red patches of skin that look raw  • Little, if any, silvery-white coating  • Sore or painful skin  Other names for this type of psoriasis are intertriginous psoriasis or flexural psoriasis. Pustular psoriasis: This type of psoriasis causes pus-filled bumps that usually appear only on the feet and hands. While the pus-filled bumps may look like an infection, the skin is not infected. The bumps don't contain bacteria or anything else that could cause an infection. Where pustular psoriasis appears, you tend to notice:  • Red, swollen skin that is dotted with pus-filled bumps  • Extremely sore or painful skin  • Brown dots (and sometimes scale) appear as the pus-filled bumps dry  Pustular psoriasis can make just about any activity that requires your hands or feet, such as typing or walking, unbearably painful. Pustular psoriasis (generalized): Serious and life-threatening, this rare type of psoriasis causes pus-filled bumps to develop on much of the skin. Also called von Zumbusch psoriasis, a flare-up causes this sequence of events:  1. Skin on most of the body suddenly turns dry, red, and tender. 2. Within hours, pus-filled bumps cover most of the skin. 3. Often within a day, the pus-filled bumps break open and pools of pus leak onto the skin. 4. As the pus dries (usually within 24 to 48 hours), the skin dries out and peels (as shown in this picture). 5. When the dried skin peels off, you see a smooth, glazed surface. 6. In a few days or weeks, you may see a new crop of pus-filled bumps covering most of the skin, as the cycle repeats itself. Anyone with pustular psoriasis also feels very sick, and may develop a fever, headache, muscle weakness, and other symptoms. Medical care is often necessary to save the person's life. Erythrodermic psoriasis: Serious and life-threatening, this type of psoriasis requires immediate medical care.  When someone develops erythrodermic psoriasis, you may notice:  • Skin on most of the body looks burnt  • Chills, fever, and the person looks extremely ill  • Muscle weakness, a rapid pulse, and severe itch  The person may also be unable to keep warm, so hypothermia can set in quickly. Most people who develop this type of psoriasis already have another type of psoriasis. Before developing erythrodermic psoriasis, they often notice that their psoriasis is worsening or not improving with treatment. If you notice either of these happening, see a board-certified dermatologist.    Nails    Nail psoriasis: With any type of psoriasis, you may see changes to your fingernails or toenails. About half of the people who have plaque psoriasis see signs of psoriasis on their fingernails at some point2. When psoriasis affects the nails, you may notice:  • Tiny dents in your nails (called “nail pits”)  • White, yellow, or brown discoloration under one or more nails  • Crumbling, rough nails  • A nail lifting up so that it's no longer attached  • Buildup of skin cells beneath one or more nails, which lifts up the nail  Treatment and proper nail care can help you control nail psoriasis. Psoriatic arthritis: If you have psoriasis, it's important to pay attention to your joints. Some people who have psoriasis develop a type of arthritis called psoriatic arthritis. This is more likely to occur if you have severe psoriasis. Most people notice psoriasis on their skin years before they develop psoriatic arthritis. It's also possible to get psoriatic arthritis before psoriasis, but this is less common. When psoriatic arthritis develops, the signs can be subtle. At first, you may notice:  • A swollen and tender joint, especially in a finger or toe  • Heel pain  • Swelling on the back of your leg, just above your heel  • Stiffness in the morning that fades during the day  Like psoriasis, psoriatic arthritis cannot be cured. Treatment can prevent psoriatic arthritis from worsening, which is important. Allowed to progress, psoriatic arthritis can become disabling.     Diagnosis and treatment of psoriasis   Psoriasis is usually diagnosed by clinical features, and skin biopsy if necessary. It is important to decrease factors that aggravate psoriasis. These include treating streptococcal infections, minimizing skin injuries, avoiding sun exposure if it exacerbates psoriasis, smoking, alcohol usage, decreasing stress, and maintaining an optimal body weight. Certain medications such as lithium, beta blockers, antimalarials, and NSAIDs have also been implicated. Suddenly stopping oral steroids or strong topical steroids can cause rebound disease. There are many categories of psoriasis treatments available. Topical therapy  Mild psoriasis is generally treated with topical agents alone. Which treatment is selected may depend on body site, extent and severity of psoriasis. • Emollients  • Coal tar preparations  • Dithranol  • Salicylic acid  • Vitamin D analogue (calcipotriol)  • Topical corticosteroids  • Calcineurin inhibitor (tacrolimus, pimecrolimus)  Phototherapy  Most psoriasis centres offer phototherapy with ultraviolet (UV) radiation, often in combination with topical or systemic agents. Types of phototherapy include  • Narrowband UVB  • Broadband UVB  • Photochemotherapy (PUVA)  • Targeted phototherapy  Systemic therapy  Moderate to severe psoriasis warrants treatment with a systemic agent and/or phototherapy. The most common treatments are:  • Methotrexate  • Ciclosporin  • Acitretin  Other medicines occasionally used for psoriasis include:  • Mycophenolate  • Apremilast  • Hydroxyurea  • Azathioprine  • 6-mercaptopurine  Systemic corticosteroids are best avoided due to a risk of severe withdrawal flare of psoriasis and adverse effects. Biologics or targeted therapies are reserved for conventional treatment-resistant severe psoriasis, mainly because of expense, as side effects compare favorably with other systemic agents.  These include:  • Anti-tumour necrosis factor-alpha antagonists (anti-TNF?) infliximab, adalimumab and etanercept  • The interleukin (IL)-12/23 antagonist ustekinumab  • IL-17 antagonists such as secukinumab  Many other monoclonal antibodies are under investigation in the treatment of psoriasis.       Scribe Attestation    I,:  Cady Henley MA am acting as a scribe while in the presence of the attending physician.:       I,:  Devendra Marks MD personally performed the services described in this documentation    as scribed in my presence.:

## 2023-08-24 DIAGNOSIS — L40.9 PSORIASIS: ICD-10-CM

## 2023-08-24 LAB
GAMMA INTERFERON BACKGROUND BLD IA-ACNC: 0.09 IU/ML
M TB IFN-G BLD-IMP: NEGATIVE
M TB IFN-G CD4+ BCKGRND COR BLD-ACNC: 0.03 IU/ML
M TB IFN-G CD4+ BCKGRND COR BLD-ACNC: 0.1 IU/ML
MITOGEN IGNF BCKGRD COR BLD-ACNC: >10 IU/ML

## 2023-08-24 RX ORDER — RISANKIZUMAB-RZAA 150 MG/ML
150 INJECTION SUBCUTANEOUS
Qty: 150 ML | Refills: 1 | Status: SHIPPED | OUTPATIENT
Start: 2023-08-24 | End: 2023-08-29

## 2023-08-24 NOTE — TELEPHONE ENCOUNTER
Spoke with patient and notified her of test results. Please send her Bishop Bazzi to [facility].       Laura Suarez/félix  08/24/23  4:05 PM

## 2023-08-29 DIAGNOSIS — L40.9 PSORIASIS: Primary | ICD-10-CM

## 2023-08-29 RX ORDER — RISANKIZUMAB-RZAA 150 MG/ML
150 INJECTION SUBCUTANEOUS
Qty: 1 ML | Refills: 6 | Status: SHIPPED | OUTPATIENT
Start: 2023-08-29

## 2023-08-29 NOTE — TELEPHONE ENCOUNTER
Dr Hasmukh Knox with [facility] at [facility], they needed clarification on patient's Skyrizi injection. She was on the pens and we sent the syringes this time. I gave a verbal to ship patient the pens but they would like a prescription as well. Patient is aware this was done. Updated refill attached.      Laura Whitman  08/29/23  10:17 AM

## 2023-08-29 NOTE — TELEPHONE ENCOUNTER
Caller: Bertin Benton    Doctor: Joselyn Duran    Reason for call: Patient needs the pen for  Risankizumab-rzaa Gerden Baires) 150 MG    per patient the office needs to call pharmacy to discuss.     [facility]: [phone]     Call back#: [phone]

## 2024-01-09 DIAGNOSIS — L40.9 PSORIASIS: ICD-10-CM

## 2024-01-09 RX ORDER — RISANKIZUMAB-RZAA 150 MG/ML
150 INJECTION SUBCUTANEOUS
Qty: 1 ML | Refills: 6 | Status: SHIPPED | OUTPATIENT
Start: 2024-01-09 | End: 2024-01-10 | Stop reason: SDUPTHER

## 2024-01-10 DIAGNOSIS — L40.9 PSORIASIS: ICD-10-CM

## 2024-01-10 RX ORDER — RISANKIZUMAB-RZAA 150 MG/ML
150 INJECTION SUBCUTANEOUS
Qty: 1 ML | Refills: 0 | Status: CANCELLED | OUTPATIENT
Start: 2024-01-10

## 2024-01-10 RX ORDER — RISANKIZUMAB-RZAA 150 MG/ML
150 INJECTION SUBCUTANEOUS
Qty: 1 ML | Refills: 6 | Status: SHIPPED | OUTPATIENT
Start: 2024-01-10 | End: 2024-01-15 | Stop reason: SDUPTHER

## 2024-01-10 NOTE — TELEPHONE ENCOUNTER
This is not a duplicate request. This is a new pharmacy.       Reason for call:   [x] Refill   [] Prior Auth  [] Other:     Office:   [] PCP/Provider -   [x] Specialty/Provider - Derm     Medication: Risankizumab-rzaa 150 mg/ml soaj , inject 150 mg under the skin every 3 months.       Pharmacy: [facility]

## 2024-01-12 ENCOUNTER — TELEPHONE (OUTPATIENT)
Age: 79
End: 2024-01-12

## 2024-01-12 NOTE — TELEPHONE ENCOUNTER
Patient needs new forms from [employer] filled out for her Skyrizi 150 mg/ML.    She needs new forms filled out every year. She reports [employer] sent office new forms, I do not see them in her chart.     [employer] #:  [phone]    Fax: [phone].    She does not want the Skyrizi being sent to [facility]. Patient will stop in office next week.

## 2024-01-13 DIAGNOSIS — L40.9 PSORIASIS: ICD-10-CM

## 2024-01-14 RX ORDER — BETAMETHASONE DIPROPIONATE 0.5 MG/G
1 CREAM TOPICAL 2 TIMES DAILY
Qty: 45 G | Refills: 0 | Status: SHIPPED | OUTPATIENT
Start: 2024-01-14

## 2024-01-15 DIAGNOSIS — L40.9 PSORIASIS: ICD-10-CM

## 2024-01-15 RX ORDER — RISANKIZUMAB-RZAA 150 MG/ML
150 INJECTION SUBCUTANEOUS
Qty: 1 ML | Refills: 6 | Status: SHIPPED | OUTPATIENT
Start: 2024-01-15

## 2024-01-16 ENCOUNTER — TELEPHONE (OUTPATIENT)
Age: 79
End: 2024-01-16

## 2024-01-16 NOTE — TELEPHONE ENCOUNTER
[facility] - Derm    Patient called to check on her Skyrizi refill, she will call her pharmacy to check to see if they received there forms.

## 2024-10-08 DIAGNOSIS — L40.9 PSORIASIS: ICD-10-CM

## 2024-10-08 RX ORDER — RISANKIZUMAB-RZAA 150 MG/ML
150 INJECTION SUBCUTANEOUS
Qty: 1 ML | Refills: 0 | Status: SHIPPED | OUTPATIENT
Start: 2024-10-08

## 2024-10-10 ENCOUNTER — TELEPHONE (OUTPATIENT)
Age: 79
End: 2024-10-10

## 2024-10-10 NOTE — TELEPHONE ENCOUNTER
PA for Skyrizi 150mg auto injector SUBMITTED     via    [x]CMM-KEY: [id number]  [][place]-Case ID #   []Availity-Auth ID # NDC #   []Faxed to plan   []Other website   []Phone call Case ID #     Office notes sent, clinical questions answered. Awaiting determination    Turnaround time for your insurance to make a decision on your Prior Authorization can take 7-21 business days.                This is a 43 y.o female who presents to PST today.  The pt reports she recently underwent an ultrasound which demonstrated a large uterine fibroid.  She reports symptoms of "bloating."  She is scheduled for a hysterectomy in the near future.

## 2024-10-11 NOTE — TELEPHONE ENCOUNTER
PA for Skyrizi 150mg auto injector  APPROVED     Date(s) approved 10/08/2024 - 10/08/2025    Case #[id number]    Patient advised by          []MyChart Message  [x]Phone call - patient stated she needed someone to follow up with her lainey assist, I stated I would send a message to Dr. King clinical staff and someone will call her to follow up.  []LMOM  []L/M to call office as no active Communication consent on file  []Unable to leave detailed message as VM not approved on Communication consent       Pharmacy advised by    [x]Fax  []Phone call    Approval letter scanned into Media No Screen shot provided below

## 2024-10-17 NOTE — TELEPHONE ENCOUNTER
Rec'd Hard Copy of Approval for Skyrizi Pen 150MG/ML for 8/10/24 - 10/9/25    Scanned into chart and sent to [facility] PA as dates are different than what is noted in this thread

## 2024-12-09 ENCOUNTER — PATIENT MESSAGE (OUTPATIENT)
Age: 79
End: 2024-12-09

## 2024-12-09 DIAGNOSIS — L40.9 PSORIASIS: ICD-10-CM

## 2024-12-09 RX ORDER — BETAMETHASONE DIPROPIONATE 0.5 MG/G
1 CREAM TOPICAL 2 TIMES DAILY
Qty: 45 G | Refills: 0 | Status: SHIPPED | OUTPATIENT
Start: 2024-12-09

## 2024-12-14 ENCOUNTER — PATIENT MESSAGE (OUTPATIENT)
Age: 79
End: 2024-12-14

## 2024-12-16 ENCOUNTER — OFFICE VISIT (OUTPATIENT)
Age: 79
End: 2024-12-16
Payer: COMMERCIAL

## 2024-12-16 VITALS — WEIGHT: 183.6 LBS | BODY MASS INDEX: 33.58 KG/M2

## 2024-12-16 DIAGNOSIS — L40.9 PSORIASIS: Primary | ICD-10-CM

## 2024-12-16 DIAGNOSIS — Z79.899 HIGH RISK MEDICATION USE: ICD-10-CM

## 2024-12-16 PROCEDURE — 99213 OFFICE O/P EST LOW 20 MIN: CPT | Performed by: DERMATOLOGY

## 2024-12-16 RX ORDER — RISANKIZUMAB-RZAA 150 MG/ML
150 INJECTION SUBCUTANEOUS
Qty: 1 ML | Refills: 0 | Status: CANCELLED | OUTPATIENT
Start: 2024-12-16

## 2024-12-16 NOTE — PATIENT INSTRUCTIONS
PSORIASIS        Assessment and Plan:  Based on a thorough discussion of this condition and the management approach to it (including a comprehensive discussion of the known risks, side effects and potential benefits of treatment), the patient (family) agrees to implement the following specific plan:  Labs ordered (TB gold, CBC)  Office to fill out [employer] Assist form for skyrizi - patient signed parts needed in office   Follow up in 1 year                 Psoriasis is a chronic inflammatory condition that causes the body to make new skin cells in days rather than weeks. As these cells pile up on the surface of the skin, you may see thick, scaly patches of thickened skin.   Psoriasis affects 2-4% of males and females. It can start at any age including childhood, with peaks of onset at 15-25 years and 50-60 years. It tends to persist lifelong, fluctuating in extent and severity. It is particularly common in Caucasians but may affect people of any race. About one-third of patients with psoriasis have family members with psoriasis.  Psoriasis is multifactorial. It is classified as an immune-mediated inflammatory disease (IMID). Genetic factors are important and influence the type of psoriasis and response to treatment.      What are the signs and symptoms of psoriasis?     There are many different types of psoriasis that each have present uniquely. The types of psoriasis include:     Plaque psoriasis: About 80% to 90% of people who have psoriasis develop this type. When plaque psoriasis appears, you may see:  Plaque psoriasis usually presents with symmetrically distributed, red, scaly plaques with well-defined edges. The scale is typically silvery white, except in skin folds where the plaques often appear shiny and they may have a moist peeling surface. The most common sites are scalp, elbows and knees, but any part of the skin can be involved. The plaques are usually very persistent without treatment.  Itch is mostly  mild but may be severe in some patients, leading to scratching and lichenification (thickened leathery skin with increased skin markings). Painful skin cracks or fissures may occur.  When psoriatic plaques clear up, they may leave brown or pale marks that can be expected to fade over several months.     Guttate psoriasis: When someone gets this type of psoriasis, you often see tiny bumps appear on the skin quite suddenly. The bumps tend to cover much of the torso, legs, and arms. Sometimes, the bumps also develop on the face, scalp, and ears. No matter where they appear, the bumps tend to be:   Small and scaly  [place]-colored to pink  Temporary, clearing in a few weeks or months without treatment  When guttate psoriasis clears, it may never return. Why this happens is still a bit of a mystery. Guttate psoriasis tends to develop in children and young adults who've had an infection, such as strep throat. It's possible that when the infection clears so does guttate psoriasis.  It's also possible to have:  Guttate psoriasis for life  See the guttate psoriasis clear and plaque psoriasis develop later in life  Plaque psoriasis when you develop guttate psoriasis  There's no way to predict what will happen after the first flare-up of guttate psoriasis clears.     Inverse psoriasis: This type of psoriasis develops in areas where skin touches skin, such as the armpits, genitals, and crease of the buttocks. Where the inverse psoriasis appears, you're likely to notice:  Smooth, red patches of skin that look raw  Little, if any, silvery-white coating  Sore or painful skin  Other names for this type of psoriasis are intertriginous psoriasis or flexural psoriasis.  Pustular psoriasis: This type of psoriasis causes pus-filled bumps that usually appear only on the feet and hands. While the pus-filled bumps may look like an infection, the skin is not infected. The bumps don't contain bacteria or anything else that could cause an  infection.  Where pustular psoriasis appears, you tend to notice:  Red, swollen skin that is dotted with pus-filled bumps  Extremely sore or painful skin  Brown dots (and sometimes scale) appear as the pus-filled bumps dry  Pustular psoriasis can make just about any activity that requires your hands or feet, such as typing or walking, unbearably painful.     Pustular psoriasis (generalized): Serious and life-threatening, this rare type of psoriasis causes pus-filled bumps to develop on much of the skin. Also called von Zumbusch psoriasis, a flare-up causes this sequence of events:  Skin on most of the body suddenly turns dry, red, and tender.  Within hours, pus-filled bumps cover most of the skin.  Often within a day, the pus-filled bumps break open and pools of pus leak onto the skin.  As the pus dries (usually within 24 to 48 hours), the skin dries out and peels (as shown in this picture).  When the dried skin peels off, you see a smooth, glazed surface.  In a few days or weeks, you may see a new crop of pus-filled bumps covering most of the skin, as the cycle repeats itself.  Anyone with pustular psoriasis also feels very sick, and may develop a fever, headache, muscle weakness, and other symptoms. Medical care is often necessary to save the person's life.     Erythrodermic psoriasis: Serious and life-threatening, this type of psoriasis requires immediate medical care. When someone develops erythrodermic psoriasis, you may notice:  Skin on most of the body looks burnt  Chills, fever, and the person looks extremely ill  Muscle weakness, a rapid pulse, and severe itch  The person may also be unable to keep warm, so hypothermia can set in quickly.  Most people who develop this type of psoriasis already have another type of psoriasis. Before developing erythrodermic psoriasis, they often notice that their psoriasis is worsening or not improving with treatment. If you notice either of these happening, see a  board-certified dermatologist.     Nails     Nail psoriasis: With any type of psoriasis, you may see changes to your fingernails or toenails. About half of the people who have plaque psoriasis see signs of psoriasis on their fingernails at some point2.  When psoriasis affects the nails, you may notice:  Tiny dents in your nails (called “nail pits”)  White, yellow, or brown discoloration under one or more nails  Crumbling, rough nails  A nail lifting up so that it's no longer attached  Buildup of skin cells beneath one or more nails, which lifts up the nail  Treatment and proper nail care can help you control nail psoriasis.     Psoriatic arthritis: If you have psoriasis, it's important to pay attention to your joints. Some people who have psoriasis develop a type of arthritis called psoriatic arthritis. This is more likely to occur if you have severe psoriasis.  Most people notice psoriasis on their skin years before they develop psoriatic arthritis. It's also possible to get psoriatic arthritis before psoriasis, but this is less common.  When psoriatic arthritis develops, the signs can be subtle. At first, you may notice:  A swollen and tender joint, especially in a finger or toe  Heel pain  Swelling on the back of your leg, just above your heel  Stiffness in the morning that fades during the day  Like psoriasis, psoriatic arthritis cannot be cured. Treatment can prevent psoriatic arthritis from worsening, which is important. Allowed to progress, psoriatic arthritis can become disabling.     Diagnosis and treatment of psoriasis   Psoriasis is usually diagnosed by clinical features, and skin biopsy if necessary.   It is important to decrease factors that aggravate psoriasis. These include treating streptococcal infections, minimizing skin injuries, avoiding sun exposure if it exacerbates psoriasis, smoking, alcohol usage, decreasing stress, and maintaining an optimal body weight. Certain medications such as lithium,  beta blockers, antimalarials, and NSAIDs have also been implicated. Suddenly stopping oral steroids or strong topical steroids can cause rebound disease.      There are many categories of psoriasis treatments available.      Topical therapy  Mild psoriasis is generally treated with topical agents alone. Which treatment is selected may depend on body site, extent and severity of psoriasis.  Emollients  Coal tar preparations  Dithranol  Salicylic acid  Vitamin D analogue (calcipotriol)  Topical corticosteroids  Calcineurin inhibitor (tacrolimus, pimecrolimus)  Phototherapy  Most psoriasis centres offer phototherapy with ultraviolet (UV) radiation, often in combination with topical or systemic agents. Types of phototherapy include  Narrowband UVB  Broadband UVB  Photochemotherapy (PUVA)  Targeted phototherapy  Systemic therapy  Moderate to severe psoriasis warrants treatment with a systemic agent and/or phototherapy. The most common treatments are:  Methotrexate  Ciclosporin  Acitretin  Other medicines occasionally used for psoriasis include:  Mycophenolate  Apremilast  Hydroxyurea  Azathioprine  6-mercaptopurine  Systemic corticosteroids are best avoided due to a risk of severe withdrawal flare of psoriasis and adverse effects.  Biologics or targeted therapies are reserved for conventional treatment-resistant severe psoriasis, mainly because of expense, as side effects compare favorably with other systemic agents. These include:  Anti-tumour necrosis factor-alpha antagonists (anti-TNF?) infliximab, adalimumab and etanercept  The interleukin (IL)-12/23 antagonist ustekinumab  IL-17 antagonists such as secukinumab  Many other monoclonal antibodies are under investigation in the treatment of psoriasis.

## 2024-12-16 NOTE — PROGRESS NOTES
"[facility] Note     Patient Name: Alisha Dang  Encounter Date: 12/16/2024     Have you been cared for by a [facility] Dermatologist in the last 3 years and, if so, which description applies to you?    Yes.  I have been here within the last 3 years, and my medical history has NOT changed since that time.  I am FEMALE/of child-bearing potential.    REVIEW OF SYSTEMS:  Have you recently had or currently have any of the following? No changes in my recent health.   PAST MEDICAL HISTORY:  Have you personally ever had or currently have any of the following?  If \"YES,\" then please provide more detail. No changes in my medical history.   HISTORY OF IMMUNOSUPPRESSION: Do you have a history of any of the following:  Systemic Immunosuppression such as Diabetes, Biologic or Immunotherapy, Chemotherapy, Organ Transplantation, Bone Marrow Transplantation or Prednisone?  No     Answering \"YES\" requires the addition of the dotphrase \"IMMUNOSUPPRESSED\" as the first diagnosis of the patient's visit.   FAMILY HISTORY:  Any \"first degree relatives\" (parent, brother, sister, or child) with the following?    No changes in my family's known health.   PATIENT EXPERIENCE:    Do you want the Dermatologist to perform a COMPLETE skin exam today including a clinical examination under the \"bra and underwear\" areas?  NO  If necessary, do we have your permission to call and leave a detailed message on your Preferred Phone number that includes your specific medical information?  Yes      No Known Allergies   Current Outpatient Medications:     amLODIPine (NORVASC) 10 mg tablet, TAKE 1 TABLET BY MOUTH EVERY DAY, Disp: , Rfl:     atorvastatin (LIPITOR) 20 mg tablet, TAKE 1 TABLET BY MOUTH EVERY DAY EVERY NIGHT, Disp: , Rfl:     betamethasone dipropionate (DIPROSONE) 0.05 % cream, Apply 1 Application topically 2 (two) times a day To affected area, Disp: 45 g, Rfl: 0    Risankizumab-rzaa (Skyrizi Pen) 150 MG/ML SOAJ, Inject 150 mg " under the skin every 3 (three) months, Disp: 1 mL, Rfl: 0          Whom besides the patient is providing clinical information about today's encounter?   NO ADDITIONAL HISTORIAN (patient alone provided history)    Physical Exam and Assessment/Plan by Diagnosis:    PSORIASIS    Physical Exam:  Anatomic Location Affected:  legs, lower arms   Morphological Description:  scaling erythematous patches   Severity: mild  Body Percent Affected: 5%  Pertinent Positives:  Pertinent Negatives:    Additional History of Present Condition:  patient is present for follow up on psoriasis. Patient is currently using Skyrizi with improvement     Assessment and Plan:  Based on a thorough discussion of this condition and the management approach to it (including a comprehensive discussion of the known risks, side effects and potential benefits of treatment), the patient (family) agrees to implement the following specific plan:  Labs ordered (TB gold, CBC)  Office to fill out [employer] Assist form for skyrizi - patient signed parts needed in office   Follow up in 1 year      Psoriasis is a chronic inflammatory condition that causes the body to make new skin cells in days rather than weeks. As these cells pile up on the surface of the skin, you may see thick, scaly patches of thickened skin.   Psoriasis affects 2-4% of males and females. It can start at any age including childhood, with peaks of onset at 15-25 years and 50-60 years. It tends to persist lifelong, fluctuating in extent and severity. It is particularly common in Caucasians but may affect people of any race. About one-third of patients with psoriasis have family members with psoriasis.  Psoriasis is multifactorial. It is classified as an immune-mediated inflammatory disease (IMID). Genetic factors are important and influence the type of psoriasis and response to treatment.     What are the signs and symptoms of psoriasis?    There are many different types of psoriasis that each  have present uniquely. The types of psoriasis include:    Plaque psoriasis: About 80% to 90% of people who have psoriasis develop this type. When plaque psoriasis appears, you may see:  Plaque psoriasis usually presents with symmetrically distributed, red, scaly plaques with well-defined edges. The scale is typically silvery white, except in skin folds where the plaques often appear shiny and they may have a moist peeling surface. The most common sites are scalp, elbows and knees, but any part of the skin can be involved. The plaques are usually very persistent without treatment.  Itch is mostly mild but may be severe in some patients, leading to scratching and lichenification (thickened leathery skin with increased skin markings). Painful skin cracks or fissures may occur.  When psoriatic plaques clear up, they may leave brown or pale marks that can be expected to fade over several months.    Guttate psoriasis: When someone gets this type of psoriasis, you often see tiny bumps appear on the skin quite suddenly. The bumps tend to cover much of the torso, legs, and arms. Sometimes, the bumps also develop on the face, scalp, and ears. No matter where they appear, the bumps tend to be:   Small and scaly  [place]-colored to pink  Temporary, clearing in a few weeks or months without treatment  When guttate psoriasis clears, it may never return. Why this happens is still a bit of a mystery. Guttate psoriasis tends to develop in children and young adults who've had an infection, such as strep throat. It's possible that when the infection clears so does guttate psoriasis.  It's also possible to have:  Guttate psoriasis for life  See the guttate psoriasis clear and plaque psoriasis develop later in life  Plaque psoriasis when you develop guttate psoriasis  There's no way to predict what will happen after the first flare-up of guttate psoriasis clears.    Inverse psoriasis: This type of psoriasis develops in areas where skin  touches skin, such as the armpits, genitals, and crease of the buttocks. Where the inverse psoriasis appears, you're likely to notice:  Smooth, red patches of skin that look raw  Little, if any, silvery-white coating  Sore or painful skin  Other names for this type of psoriasis are intertriginous psoriasis or flexural psoriasis.  Pustular psoriasis: This type of psoriasis causes pus-filled bumps that usually appear only on the feet and hands. While the pus-filled bumps may look like an infection, the skin is not infected. The bumps don't contain bacteria or anything else that could cause an infection.  Where pustular psoriasis appears, you tend to notice:  Red, swollen skin that is dotted with pus-filled bumps  Extremely sore or painful skin  Brown dots (and sometimes scale) appear as the pus-filled bumps dry  Pustular psoriasis can make just about any activity that requires your hands or feet, such as typing or walking, unbearably painful.    Pustular psoriasis (generalized): Serious and life-threatening, this rare type of psoriasis causes pus-filled bumps to develop on much of the skin. Also called von Zumbusch psoriasis, a flare-up causes this sequence of events:  Skin on most of the body suddenly turns dry, red, and tender.  Within hours, pus-filled bumps cover most of the skin.  Often within a day, the pus-filled bumps break open and pools of pus leak onto the skin.  As the pus dries (usually within 24 to 48 hours), the skin dries out and peels (as shown in this picture).  When the dried skin peels off, you see a smooth, glazed surface.  In a few days or weeks, you may see a new crop of pus-filled bumps covering most of the skin, as the cycle repeats itself.  Anyone with pustular psoriasis also feels very sick, and may develop a fever, headache, muscle weakness, and other symptoms. Medical care is often necessary to save the person's life.    Erythrodermic psoriasis: Serious and life-threatening, this type of  psoriasis requires immediate medical care. When someone develops erythrodermic psoriasis, you may notice:  Skin on most of the body looks burnt  Chills, fever, and the person looks extremely ill  Muscle weakness, a rapid pulse, and severe itch  The person may also be unable to keep warm, so hypothermia can set in quickly.  Most people who develop this type of psoriasis already have another type of psoriasis. Before developing erythrodermic psoriasis, they often notice that their psoriasis is worsening or not improving with treatment. If you notice either of these happening, see a board-certified dermatologist.    Nails    Nail psoriasis: With any type of psoriasis, you may see changes to your fingernails or toenails. About half of the people who have plaque psoriasis see signs of psoriasis on their fingernails at some point2.  When psoriasis affects the nails, you may notice:  Tiny dents in your nails (called “nail pits”)  White, yellow, or brown discoloration under one or more nails  Crumbling, rough nails  A nail lifting up so that it's no longer attached  Buildup of skin cells beneath one or more nails, which lifts up the nail  Treatment and proper nail care can help you control nail psoriasis.    Psoriatic arthritis: If you have psoriasis, it's important to pay attention to your joints. Some people who have psoriasis develop a type of arthritis called psoriatic arthritis. This is more likely to occur if you have severe psoriasis.  Most people notice psoriasis on their skin years before they develop psoriatic arthritis. It's also possible to get psoriatic arthritis before psoriasis, but this is less common.  When psoriatic arthritis develops, the signs can be subtle. At first, you may notice:  A swollen and tender joint, especially in a finger or toe  Heel pain  Swelling on the back of your leg, just above your heel  Stiffness in the morning that fades during the day  Like psoriasis, psoriatic arthritis cannot be  cured. Treatment can prevent psoriatic arthritis from worsening, which is important. Allowed to progress, psoriatic arthritis can become disabling.    Diagnosis and treatment of psoriasis   Psoriasis is usually diagnosed by clinical features, and skin biopsy if necessary.   It is important to decrease factors that aggravate psoriasis. These include treating streptococcal infections, minimizing skin injuries, avoiding sun exposure if it exacerbates psoriasis, smoking, alcohol usage, decreasing stress, and maintaining an optimal body weight. Certain medications such as lithium, beta blockers, antimalarials, and NSAIDs have also been implicated. Suddenly stopping oral steroids or strong topical steroids can cause rebound disease.     There are many categories of psoriasis treatments available.     Topical therapy  Mild psoriasis is generally treated with topical agents alone. Which treatment is selected may depend on body site, extent and severity of psoriasis.  Emollients  Coal tar preparations  Dithranol  Salicylic acid  Vitamin D analogue (calcipotriol)  Topical corticosteroids  Calcineurin inhibitor (tacrolimus, pimecrolimus)  Phototherapy  Most psoriasis centres offer phototherapy with ultraviolet (UV) radiation, often in combination with topical or systemic agents. Types of phototherapy include  Narrowband UVB  Broadband UVB  Photochemotherapy (PUVA)  Targeted phototherapy  Systemic therapy  Moderate to severe psoriasis warrants treatment with a systemic agent and/or phototherapy. The most common treatments are:  Methotrexate  Ciclosporin  Acitretin  Other medicines occasionally used for psoriasis include:  Mycophenolate  Apremilast  Hydroxyurea  Azathioprine  6-mercaptopurine  Systemic corticosteroids are best avoided due to a risk of severe withdrawal flare of psoriasis and adverse effects.  Biologics or targeted therapies are reserved for conventional treatment-resistant severe psoriasis, mainly because of  expense, as side effects compare favorably with other systemic agents. These include:  Anti-tumour necrosis factor-alpha antagonists (anti-TNF?) infliximab, adalimumab and etanercept  The interleukin (IL)-12/23 antagonist ustekinumab  IL-17 antagonists such as secukinumab  Many other monoclonal antibodies are under investigation in the treatment of psoriasis.     Scribe Attestation      I,:  Esther Singleton am acting as a scribe while in the presence of the attending physician.:       I,:  Barrington Parish MD personally performed the services described in this documentation    as scribed in my presence.:

## 2024-12-18 ENCOUNTER — LAB (OUTPATIENT)
Dept: LAB | Facility: CLINIC | Age: 79
End: 2024-12-18
Payer: COMMERCIAL

## 2024-12-18 DIAGNOSIS — Z79.899 HIGH RISK MEDICATION USE: ICD-10-CM

## 2024-12-18 LAB
BASOPHILS # BLD AUTO: 0.07 THOUSANDS/ÂΜL (ref 0–0.1)
BASOPHILS NFR BLD AUTO: 2 % (ref 0–1)
EOSINOPHIL # BLD AUTO: 0.21 THOUSAND/ÂΜL (ref 0–0.61)
EOSINOPHIL NFR BLD AUTO: 5 % (ref 0–6)
ERYTHROCYTE [DISTWIDTH] IN BLOOD BY AUTOMATED COUNT: 12.8 % (ref 11.6–15.1)
HCT VFR BLD AUTO: 45 % (ref 34.8–46.1)
HGB BLD-MCNC: 14.5 G/DL (ref 11.5–15.4)
IMM GRANULOCYTES # BLD AUTO: 0.01 THOUSAND/UL (ref 0–0.2)
IMM GRANULOCYTES NFR BLD AUTO: 0 % (ref 0–2)
LYMPHOCYTES # BLD AUTO: 2.74 THOUSANDS/ÂΜL (ref 0.6–4.47)
LYMPHOCYTES NFR BLD AUTO: 58 % (ref 14–44)
MCH RBC QN AUTO: 31.3 PG (ref 26.8–34.3)
MCHC RBC AUTO-ENTMCNC: 32.2 G/DL (ref 31.4–37.4)
MCV RBC AUTO: 97 FL (ref 82–98)
MONOCYTES # BLD AUTO: 0.44 THOUSAND/ÂΜL (ref 0.17–1.22)
MONOCYTES NFR BLD AUTO: 9 % (ref 4–12)
NEUTROPHILS # BLD AUTO: 1.23 THOUSANDS/ÂΜL (ref 1.85–7.62)
NEUTS SEG NFR BLD AUTO: 26 % (ref 43–75)
NRBC BLD AUTO-RTO: 0 /100 WBCS
PLATELET # BLD AUTO: 250 THOUSANDS/UL (ref 149–390)
PMV BLD AUTO: 10.4 FL (ref 8.9–12.7)
RBC # BLD AUTO: 4.63 MILLION/UL (ref 3.81–5.12)
WBC # BLD AUTO: 4.7 THOUSAND/UL (ref 4.31–10.16)

## 2024-12-18 PROCEDURE — 36415 COLL VENOUS BLD VENIPUNCTURE: CPT

## 2024-12-18 PROCEDURE — 85025 COMPLETE CBC W/AUTO DIFF WBC: CPT

## 2024-12-18 PROCEDURE — 86480 TB TEST CELL IMMUN MEASURE: CPT

## 2024-12-19 LAB
GAMMA INTERFERON BACKGROUND BLD IA-ACNC: 0.38 IU/ML
M TB IFN-G BLD-IMP: NEGATIVE
M TB IFN-G CD4+ BCKGRND COR BLD-ACNC: -0.13 IU/ML
M TB IFN-G CD4+ BCKGRND COR BLD-ACNC: -0.18 IU/ML
MITOGEN IGNF BCKGRD COR BLD-ACNC: 9.62 IU/ML

## 2024-12-27 ENCOUNTER — RESULTS FOLLOW-UP (OUTPATIENT)
Dept: DERMATOLOGY | Facility: CLINIC | Age: 79
End: 2024-12-27

## 2024-12-27 NOTE — RESULT ENCOUNTER NOTE
Please let patient know results are WNL.  Follow up on [employer] Assist form for Skyrizi which was initiated at last visit by Dr. Parish.  Thank you

## 2024-12-30 NOTE — TELEPHONE ENCOUNTER
----- Message from Chica Michele RN sent at 12/30/2024  8:54 AM EST -----  Can whoever submits her [employer] renewal forms please call her once submitted? She's paranoid we will forget to send! Thank you

## 2024-12-30 NOTE — TELEPHONE ENCOUNTER
----- Message from DC Colon sent at 12/27/2024 12:54 PM EST -----  Please let patient know results are WNL.  Follow up on [facility] Assist form for [place] which was initiated at last visit by Dr. Parish.  Thank you

## 2024-12-30 NOTE — TELEPHONE ENCOUNTER
Pt calling in to f/u on missed call, relayed response.    Pt would like call back to update after everything sent into [employer] as she states we forgot to send her forms in last year. Noted will relay request to call and confirm. No other questions at this time.

## 2024-12-30 NOTE — TELEPHONE ENCOUNTER
Spoke to the patient informed her that patient assistance paperwork was submitted to [email or web address] . Patient advised to wait to hear for a response.